# Patient Record
Sex: FEMALE | Race: WHITE | Employment: OTHER | URBAN - METROPOLITAN AREA
[De-identification: names, ages, dates, MRNs, and addresses within clinical notes are randomized per-mention and may not be internally consistent; named-entity substitution may affect disease eponyms.]

---

## 2018-11-13 ENCOUNTER — OFFICE VISIT (OUTPATIENT)
Dept: OTHER | Facility: IMAGING CENTER | Age: 38
End: 2018-11-13
Payer: COMMERCIAL

## 2018-11-13 VITALS
SYSTOLIC BLOOD PRESSURE: 148 MMHG | HEIGHT: 70 IN | DIASTOLIC BLOOD PRESSURE: 92 MMHG | RESPIRATION RATE: 14 BRPM | HEART RATE: 69 BPM | OXYGEN SATURATION: 99 % | BODY MASS INDEX: 18.38 KG/M2 | TEMPERATURE: 99.1 F | WEIGHT: 128.4 LBS

## 2018-11-13 DIAGNOSIS — Z00.00 HEALTHCARE MAINTENANCE: ICD-10-CM

## 2018-11-13 DIAGNOSIS — I10 ESSENTIAL HYPERTENSION: ICD-10-CM

## 2018-11-13 DIAGNOSIS — R53.83 FATIGUE, UNSPECIFIED TYPE: ICD-10-CM

## 2018-11-13 DIAGNOSIS — J30.2 SEASONAL ALLERGIES: ICD-10-CM

## 2018-11-13 DIAGNOSIS — E55.9 VITAMIN D DEFICIENCY: ICD-10-CM

## 2018-11-13 PROCEDURE — 99205 OFFICE O/P NEW HI 60 MIN: CPT | Performed by: INTERNAL MEDICINE

## 2018-11-13 ASSESSMENT — PATIENT HEALTH QUESTIONNAIRE - PHQ9: CLINICAL INTERPRETATION OF PHQ2 SCORE: 0

## 2018-11-13 NOTE — LETTER
[facility]  Kanchan Benavidez D.O.  [address] [place] [address]  Fax: [phone]   Authorization for Release/Disclosure of   Protected Health Information   Name: MARISOL SHOOK : 1980 SSN: [id number]   Address: [address] [place] [address] [place] [address] Phone:    [phone] (home)    I authorize the entity listed below to release/disclose the PHI below to:   [facility]/Kanchan Benavidez D.O. and Kanchan Benavidez D.O.   Provider or Entity Name:     Address   City, State, Zip   Phone:      Fax:     Reason for request: continuity of care   Information to be released:    [  ] LAST COLONOSCOPY,  including any PATH REPORT and follow-up  [  ] LAST FIT/COLOGUARD RESULT [  ] LAST DEXA  [  ] LAST MAMMOGRAM  [  ] LAST PAP  [  ] LAST LABS [  ] RETINA EXAM REPORT  [  ] IMMUNIZATION RECORDS  [  ] Release all info      [  ] Check here and initial the line next to each item to release ALL health information INCLUDING  _____ Care and treatment for drug and / or alcohol abuse  _____ HIV testing, infection status, or AIDS  _____ Genetic Testing    DATES OF SERVICE OR TIME PERIOD TO BE DISCLOSED: _____________  I understand and acknowledge that:  * This Authorization may be revoked at any time by you in writing, except if your health information has already been used or disclosed.  * Your health information that will be used or disclosed as a result of you signing this authorization could be re-disclosed by the recipient. If this occurs, your re-disclosed health information may no longer be protected by State or Federal laws.  * You may refuse to sign this Authorization. Your refusal will not affect your ability to obtain treatment.  * This Authorization becomes effective upon signing and will  on (date) __________.      If no date is indicated, this Authorization will  one (1) year from the signature date.    Name: Marisol Shook    Signature:   Date:     2018       PLEASE FAX REQUESTED RECORDS BACK  TO: [phone]

## 2018-11-13 NOTE — ASSESSMENT & PLAN NOTE
PAP 2016 (?)    Lifestyle  Exercise: Daily walks with her dog for a few miles.  Long hikes.  Occupational work is physical (LMT). Gardening/yard work.   Diet: Feels diet is overall healthy. Breakfast - eggs, Lunch - usually homemade leftovers. Dinner - Fresh veggies, meat, rice. Snack - crackers, nuts, bone broth. Beverages - coffee am (for caffeine/energy), water.  Sleep: Usually has trouble falling asleep.  Initially wakes up early no later than 6:30 AM.  Relaxation: Prior, nature walks, meditation, reading.  Hobbies include gardening, cooking, chicken raising, great outdoors.  Relationships:  Wonderful supportive  of 10 years. Love the work she does. Very spiritual and has a daily meditation/gratitude practice. Close girlfriends that are very supportive as well.  Current Stressors: Taking care of-balancing work and home.  Always busy.  Feels she has always had an active mind and tends to worry about everything and anything.  Prior experience with complementary therapies: acupuncture, herbs, homeopathy

## 2018-11-13 NOTE — PROGRESS NOTES
Chief Complaint   Patient presents with   • Establish Care   • Hypertension     went to the dentist in April and was told she has high BP, sees a couple [facility] doctors who suggested our practice      Bharti Shook is a 38 y.o. female who presents today to establish care at [facility] and to discuss hypertension.     HPI:  Essential hypertension  Patient reports a history of elevated blood pressure discovered at her visit to the dentist for routine cleaning.  Does not recall the numbers.  No family history of elevated blood pressure.  On past visits to her dentist, blood pressures have not been elevated.  Patient denies headaches or dizziness no changes in vision.  She has recently purchased a home cuff but has not been using very regularly as she feels it makes her anxious.  The highest she has gotten was systolic 160.  Bottom number usually ranges from .  She feels that it is very random.  Most other times are 120/80's.  Does not feel like she has too much stress in her life.  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  She does consider herself generally a high strung person and worries a lot.  Patient denies high sodium intake, does use Celtic Sea salt and some of her meals.  Has 1 cup of coffee in the morning per day.  Patient does not do aerobic exercise but is not sedentary.  Patient has been following with Dr. Lizette Raza and Ursula Sepulveda who advised her to establish with PCP for further evaluation and treatment.  Was on an  snack root extract supplement which did help bring down her blood pressure but she had side effects.  Prefers not to start medications but will if needed.  In the last 6 months, she does guided meditation in the mornings.  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    Notes that she has had lab work done at the end of March at [facility]. Recalls her thyroid labs were somewhat sluggish but overall normal.     Healthcare maintenance  PAP 2016  (?)    Lifestyle  Exercise: Daily walks with her dog for a few miles.  Long hikes.  Occupational work is physical (LMT). Gardening/yard work.   Diet: Feels diet is overall healthy. Breakfast - eggs, Lunch - usually homemade leftovers. Dinner - Fresh veggies, meat, rice. Snack - crackers, nuts, bone broth. Beverages - coffee am (for caffeine/energy), water.  Sleep: Usually has trouble falling asleep.  Initially wakes up early no later than 6:30 AM.  Relaxation: Prior, nature walks, meditation, reading.  Hobbies include gardening, cooking, chicken raising, great outdoors.  Relationships:  Wonderful supportive  of 10 years. Love the work she does. Very spiritual and has a daily meditation/gratitude practice. Close girlfriends that are very supportive as well.  Current Stressors: Taking care of-balancing work and home.  Always busy.  Feels she has always had an active mind and tends to worry about everything and anything.  Prior experience with complementary therapies: acupuncture, herbs, homeopathy    Seasonal allergies  Chronic condition.  Worse in the spring and fall.  Taking homeopathic remedies with improvement.    3/30/2018 Lab Amy  CBC, CMP, fasting lipids, ferritin, FSH, A1c within normal limits.  Vitamin D 25 hydroxy 40.3, TSH 2.74, free T3 3.4, free T4 1.3, TPO antibodies 12, thyroglobulin antibody < 1.0, TSI <0.1.    Current medicines (including changes today)  Current Outpatient Prescriptions   Medication Sig Dispense Refill   • Cholecalciferol (VITAMIN D PO) Take  by mouth.       No current facility-administered medications for this visit.      She  has a past medical history of Hypertension.  She  has a past surgical history that includes tympanoplasty (Bilateral).  Social History   Substance Use Topics   • Smoking status: Former Smoker   • Smokeless tobacco: Never Used      Comment: in college   • Alcohol use Yes      Comment: socially weekly     Family History   Problem Relation Age of Onset   •  "Thyroid Mother         hypothyroid, Hashimoto's   • Thyroid Maternal Grandmother         hypothyroid, Hashimoto's   • No Known Problems Father    • No Known Problems Brother    • Cancer Maternal Grandfather         bone or blood cancer   • Lung Cancer Paternal Grandmother         smoking history   • No Known Problems Paternal Grandfather      No family status information on file.     Social History     Social History Narrative   • No narrative on file     ROS  Constitutional: Negative for fever, chills, weight loss and malaise/fatigue.   HENT: Negative for ear pain, nosebleeds, congestion, sore throat and neck pain.    Eyes: Negative for blurred vision.   Respiratory: Negative for cough, sputum production, shortness of breath and wheezing.    Cardiovascular: Negative for chest pain, orthopnea and leg swelling.  Infrequent palpitations since she was a kid-seems to be worsened with increased stress.  Palpitations last for a few seconds and are stopped with a cough.  Occurs every few months.  Gastrointestinal: Negative for heartburn, nausea, vomiting and abdominal pain.   Genitourinary: Negative for dysuria, urgency and frequency.   Musculoskeletal: Negative for myalgias, back pain and joint pain.   Skin: Negative for rash and itching.   Neurological: Negative for dizziness, tingling, tremors, sensory change, focal weakness and headaches.   Endo/Heme/Allergies: Does not bruise/bleed easily.   Psychiatric/Behavioral: Negative for depression, anxiety, or memory loss.  Considers herself \"high-strung\" \"on edge\"  All other systems reviewed and are negative except as in HPI.     Objective:   Blood pressure 148/92, pulse 69, temperature 37.3 °C (99.1 °F), temperature source Temporal, resp. rate 14, height 1.778 m (5' 10\"), weight 58.2 kg (128 lb 6.4 oz), last menstrual period 11/11/2018, SpO2 99 %. Body mass index is 18.42 kg/m².  Physical Exam:  Constitutional: Alert, no distress.  Skin: Warm, dry, good turgor, no rashes in " visible areas.  Eye: Equal, round and reactive, conjunctiva clear, lids normal.  Neck: Trachea midline, no masses, no thyromegaly.  Respiratory: Unlabored respiratory effort, lungs clear to auscultation, no wheezes, no ronchi.  Cardiovascular: Normal S1, S2, no murmur, no edema.  Abdomen: Soft, non-tender, no masses, no hepatosplenomegaly.  Psych: Alert and oriented x3, normal affect and mood.    Assessment and Plan:   The following treatment plan was discussed  1. Essential hypertension   One reading documented in clinic setting but has had elevation with other providers. Advised patient to keep a log of her blood pressure readings for the next 2-week 2-3 times a day randomly.  Patient advised on proper blood pressure measurement technique.  Advised to return to office for MA/nurse visit with her blood pressure cuff for another BP check. R/O secondary HTN including renovascular cause. No hypokalemia or symptoms of sympathetic overactivity. Normal thyroid tests earlier this year. Discussed treatment options including medications (would advise HCTZ to start), however, patient would like to try some other interventions prior to medicatations. Discussed lifestyle changes including importance of being physically active (suggested daily for 7 minutes workout to start with goal to work up to 20 min daily), maintaining healthy weight, limiting alcohol intake, managing and coping with stress. Discussed continuing with mind-body therapies including meditation practices -handout about breathing exercises to do twice a day, consider starting Qi gong weekly.  Discussed dietary interventions including low-sodium limiting to 1800 mg/day, anti-inflammatory diet. Advise adding 1000 mg of omega 3 fatty acids as fish intake is low. Consider addition of magnesium non-oxide supplement 200-400 mg day. Cooking with garlic regularly and drinking hibiscus tea may have some benefits as well.  US-RENAL ARTERY DUPLEX COMP   2. Vitamin D  deficiency   Advise maintaining serum vitamin D 25-hydroxy level of greater than 40 ng/mL.  Recheck levels and continue current supplementation for now.  Patient will return with dosage she is currently using. VITAMIN D,25 HYDROXY   3. Fatigue, unspecified type   Mostly morning fatigue. Check vitamin D and B12 levels. Quality sleep may be a factor which we can discuss in more detail at next visit. VITAMIN B12    US-RENAL ARTERY DUPLEX COMP   4. Seasonal allergies   Overall stable with current treatment.    5. Healthcare maintenance   Discussed CDC recommendations for immunizations and USPSTF guidelines for screening exams.      Records requested.    Followup: Return in about 1 month (around 12/13/2018), or if symptoms worsen or fail to improve, for Lab review.  Spent 60 minutes in face-to-tace patient contact in which greater than 50% of the visit was spent in counseling and coordination of care including patient education about hypertension, differential diagnoses and treatment options including HCTZ, risks/benefits and alternatives including other lifestyle changes and use of mind-body therapies.  Discussed natural sources of magnesium.  Labs reviewed with the patient in detail. We also reviewed PMH, family history, and each of her chronic diagnoses in regards to current management, specialty physicians, symptom control, and future planning.  Please refer to assessment and plan for additional details.          I have worked with consultants from the vendor as well as technical experts from [employer] to optimize the interface. I have made every reasonable attempt to correct obvious errors, but I expect that there are errors of grammar and possibly content that I did not discover before finalizing the note.

## 2018-11-13 NOTE — ASSESSMENT & PLAN NOTE
Patient reports a history of elevated blood pressure discovered at her visit to the dentist for routine cleaning.  Does not recall the numbers.  No family history of elevated blood pressure.  On past visits to her dentist, blood pressures have not been elevated.  Patient denies headaches or dizziness no changes in vision.  She has recently purchased a home cuff but has not been using very regularly as she feels it makes her anxious.  The highest she has gotten was systolic 160.  Bottom number usually ranges from .  She feels that it is very random.  Most other times are 120/80's.  Does not feel like she has too much stress in her life.  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  She does consider herself generally a high strung person and worries a lot.  Patient denies high sodium intake, does use Celtic Sea salt and some of her meals.  Has 1 cup of coffee in the morning per day.  Patient does not do aerobic exercise but is not sedentary.  Patient has been following with Dr. Lizette Raza and Ursula Sepulveda who advised her to establish with PCP for further evaluation and treatment.  Was on an  snack root extract supplement which did help bring down her blood pressure but she had side effects.  Prefers not to start medications but will if needed.  In the last 6 months, she does guided meditation in the mornings.  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    Notes that she has had lab work done at the end of March at [facility]. Recalls her thyroid labs were somewhat sluggish but overall normal.

## 2018-12-13 ENCOUNTER — TELEPHONE (OUTPATIENT)
Dept: OTHER | Facility: IMAGING CENTER | Age: 38
End: 2018-12-13

## 2018-12-14 NOTE — TELEPHONE ENCOUNTER
Called and lm for patient regarding fax received from [employer]. They have tried to reach out to her and have not heard back. Gave patient their number to call and schedule.

## 2019-01-10 ENCOUNTER — OFFICE VISIT (OUTPATIENT)
Dept: URGENT CARE | Facility: MEDICAL CENTER | Age: 39
End: 2019-01-10
Payer: COMMERCIAL

## 2019-01-10 ENCOUNTER — HOSPITAL ENCOUNTER (OUTPATIENT)
Facility: MEDICAL CENTER | Age: 39
End: 2019-01-10
Attending: NURSE PRACTITIONER
Payer: COMMERCIAL

## 2019-01-10 VITALS
TEMPERATURE: 100.2 F | DIASTOLIC BLOOD PRESSURE: 90 MMHG | HEIGHT: 70 IN | SYSTOLIC BLOOD PRESSURE: 132 MMHG | HEART RATE: 90 BPM | BODY MASS INDEX: 18.32 KG/M2 | WEIGHT: 128 LBS | OXYGEN SATURATION: 99 %

## 2019-01-10 DIAGNOSIS — R30.0 DYSURIA: ICD-10-CM

## 2019-01-10 DIAGNOSIS — R35.0 FREQUENCY OF URINATION: ICD-10-CM

## 2019-01-10 DIAGNOSIS — N30.01 ACUTE CYSTITIS WITH HEMATURIA: ICD-10-CM

## 2019-01-10 LAB
APPEARANCE UR: CLEAR
BILIRUB UR STRIP-MCNC: NEGATIVE MG/DL
COLOR UR AUTO: YELLOW
GLUCOSE UR STRIP.AUTO-MCNC: NEGATIVE MG/DL
KETONES UR STRIP.AUTO-MCNC: NORMAL MG/DL
LEUKOCYTE ESTERASE UR QL STRIP.AUTO: NORMAL
NITRITE UR QL STRIP.AUTO: NEGATIVE
PH UR STRIP.AUTO: 6 [PH] (ref 5–8)
PROT UR QL STRIP: 30 MG/DL
RBC UR QL AUTO: NORMAL
SP GR UR STRIP.AUTO: 1.01
UROBILINOGEN UR STRIP-MCNC: 0.2 MG/DL

## 2019-01-10 PROCEDURE — 87077 CULTURE AEROBIC IDENTIFY: CPT

## 2019-01-10 PROCEDURE — 87086 URINE CULTURE/COLONY COUNT: CPT

## 2019-01-10 PROCEDURE — 81002 URINALYSIS NONAUTO W/O SCOPE: CPT | Performed by: NURSE PRACTITIONER

## 2019-01-10 PROCEDURE — 99214 OFFICE O/P EST MOD 30 MIN: CPT | Performed by: NURSE PRACTITIONER

## 2019-01-10 PROCEDURE — 87186 SC STD MICRODIL/AGAR DIL: CPT

## 2019-01-10 RX ORDER — NITROFURANTOIN 25; 75 MG/1; MG/1
100 CAPSULE ORAL EVERY 12 HOURS
Qty: 10 CAP | Refills: 0 | Status: SHIPPED | OUTPATIENT
Start: 2019-01-10 | End: 2019-01-15

## 2019-01-10 RX ORDER — PHENAZOPYRIDINE HYDROCHLORIDE 200 MG/1
200 TABLET, FILM COATED ORAL 3 TIMES DAILY
Qty: 6 TAB | Refills: 0 | Status: SHIPPED | OUTPATIENT
Start: 2019-01-10 | End: 2019-01-12

## 2019-01-10 ASSESSMENT — LIFESTYLE VARIABLES: SUBSTANCE_ABUSE: 0

## 2019-01-10 ASSESSMENT — ENCOUNTER SYMPTOMS
FEVER: 0
FLANK PAIN: 0
VOMITING: 0
BACK PAIN: 0
CHILLS: 1
SWEATS: 0
BRUISES/BLEEDS EASILY: 0
DIARRHEA: 0
DIZZINESS: 0
ABDOMINAL PAIN: 0
NAUSEA: 0
CONSTIPATION: 0

## 2019-01-11 DIAGNOSIS — R35.0 FREQUENCY OF URINATION: ICD-10-CM

## 2019-01-11 DIAGNOSIS — N30.01 ACUTE CYSTITIS WITH HEMATURIA: ICD-10-CM

## 2019-01-11 NOTE — PROGRESS NOTES
"Subjective:      Bharti Shook is a 38 y.o. female who presents with UTI (frequency/ urgency/ lower back pain/ abdominal cramp/ blood X1 1/2 week)         Denies past medical, surgical or family history that is significant to today's problem.   RX or OTC medications-- reviewed with patient today.   Allergies   Allergen Reactions   • Seasonal          Dysuria    This is a new problem. The current episode started 1 to 4 weeks ago. The problem occurs every urination. The problem has been rapidly worsening. The quality of the pain is described as aching and burning. The pain is at a severity of 5/10. The pain is moderate. There has been no fever. She is sexually active. There is no history of pyelonephritis. Associated symptoms include chills, frequency, hesitancy and urgency. Pertinent negatives include no discharge, flank pain, hematuria, nausea, sweats or vomiting. She has tried acetaminophen and increased fluids for the symptoms. The treatment provided mild relief. There is no history of catheterization, kidney stones, recurrent UTIs or urinary stasis.       Review of Systems   Constitutional: Positive for chills. Negative for fever and malaise/fatigue.   Gastrointestinal: Negative for abdominal pain, constipation, diarrhea, nausea and vomiting.   Genitourinary: Positive for dysuria, frequency, hesitancy and urgency. Negative for flank pain and hematuria.   Musculoskeletal: Negative for back pain.   Neurological: Negative for dizziness.   Endo/Heme/Allergies: Does not bruise/bleed easily.   Psychiatric/Behavioral: Negative for substance abuse.          Objective:     /90   Pulse 90   Temp 37.9 °C (100.2 °F) (Temporal)   Ht 1.778 m (5' 10\")   Wt 58.1 kg (128 lb)   SpO2 99%   BMI 18.37 kg/m²      Physical Exam   Constitutional: Vital signs are normal. She appears well-developed and well-nourished. She is cooperative.  Non-toxic appearance. She does not appear ill. No distress.   HENT:   Head: Normocephalic. "   Cardiovascular: Normal rate and regular rhythm.    Pulmonary/Chest: Effort normal and breath sounds normal.   Abdominal: Soft. Normal appearance. She exhibits no distension. There is no tenderness. There is no rigidity, no rebound, no guarding and no CVA tenderness.   Musculoskeletal:        Lumbar back: Normal.   Neurological: She is alert.   Skin: Skin is warm and dry. She is not diaphoretic.   Nursing note and vitals reviewed.              Assessment/Plan:     1. Frequency of urination    - POCT Urinalysis  - POCT Urinalysis  - Urine Culture; Future    2. Dysuria    - POCT Urinalysis  - phenazopyridine (PYRIDIUM) 200 MG Tab; Take 1 Tab by mouth 3 times a day for 2 days.  Dispense: 6 Tab; Refill: 0    3. Acute cystitis with hematuria    - Urine Culture; Future  - nitrofurantoin monohydr macro (MACROBID) 100 MG Cap; Take 1 Cap by mouth every 12 hours for 5 days.  Dispense: 10 Cap; Refill: 0    Keep well hydrated  Return to clinic or PCP  Prn  if current symptoms are not resolving in a satisfactory manner or sooner if new or worsening symptoms occur.   Patient was advised of signs and symptoms which would warrant further evaluation and /or emergent evaluation in ER.  Verbalized agreement with this treatment plan and seemed to understand without barriers. Questions were encouraged and answered to patients satisfaction.   Aftercare instructions were given to pt/ caregiver.

## 2019-01-13 LAB
BACTERIA UR CULT: ABNORMAL
BACTERIA UR CULT: ABNORMAL
SIGNIFICANT IND 70042: ABNORMAL
SITE SITE: ABNORMAL
SOURCE SOURCE: ABNORMAL

## 2021-02-27 ENCOUNTER — OFFICE VISIT (OUTPATIENT)
Dept: URGENT CARE | Facility: CLINIC | Age: 41
End: 2021-02-27
Payer: COMMERCIAL

## 2021-02-27 VITALS
OXYGEN SATURATION: 97 % | WEIGHT: 130 LBS | RESPIRATION RATE: 16 BRPM | SYSTOLIC BLOOD PRESSURE: 190 MMHG | DIASTOLIC BLOOD PRESSURE: 104 MMHG | HEIGHT: 70 IN | TEMPERATURE: 97.3 F | HEART RATE: 81 BPM | BODY MASS INDEX: 18.61 KG/M2

## 2021-02-27 DIAGNOSIS — R09.82 POST-NASAL DRIP: ICD-10-CM

## 2021-02-27 DIAGNOSIS — I10 ESSENTIAL HYPERTENSION: ICD-10-CM

## 2021-02-27 DIAGNOSIS — R21 RASH AND NONSPECIFIC SKIN ERUPTION: ICD-10-CM

## 2021-02-27 PROCEDURE — 99214 OFFICE O/P EST MOD 30 MIN: CPT | Performed by: NURSE PRACTITIONER

## 2021-02-27 RX ORDER — LORATADINE 10 MG/1
1 CAPSULE, LIQUID FILLED ORAL DAILY
Qty: 30 CAPSULE | Refills: 0 | Status: SHIPPED | OUTPATIENT
Start: 2021-02-27 | End: 2021-02-27

## 2021-02-27 RX ORDER — UBIDECARENONE 75 MG
100 CAPSULE ORAL DAILY
COMMUNITY

## 2021-02-27 RX ORDER — LISINOPRIL 10 MG/1
10 TABLET ORAL DAILY
Qty: 30 TABLET | Refills: 1 | Status: SHIPPED | OUTPATIENT
Start: 2021-02-27 | End: 2021-03-29

## 2021-02-27 RX ORDER — LORATADINE 10 MG/1
1 CAPSULE, LIQUID FILLED ORAL DAILY
Qty: 30 CAPSULE | Refills: 1 | Status: SHIPPED | OUTPATIENT
Start: 2021-02-27 | End: 2021-03-29

## 2021-02-27 ASSESSMENT — ENCOUNTER SYMPTOMS
WHEEZING: 0
SHORTNESS OF BREATH: 0
HEADACHES: 0
CLAUDICATION: 0
COUGH: 0
DIZZINESS: 0
PND: 0
PALPITATIONS: 0
ORTHOPNEA: 0

## 2021-02-27 NOTE — PROGRESS NOTES
Subjective:   Bharti Shook is a 40 y.o. female who presents for Rash (face, chest and back x1 wk ) and Sore Throat      HPI  40 year old female patient in urgent care for new onset rash to face, chest and back as well as a sore throat.  Patient denies any changes to her diet, personal hygiene products.  Patient has been using over-the-counter Benadryl and Benadryl cream with moderate relief of symptoms.    When asked about elevated blood pressure patient states that she has been told she has it in the past but has never been treated for it.  Denies changes in vision, dizziness, headaches, chest pain, nausea or vomiting.    Review of Systems   Respiratory: Negative for cough, shortness of breath and wheezing.    Cardiovascular: Negative for chest pain, palpitations, orthopnea, claudication, leg swelling and PND.   Genitourinary: Negative for frequency, hematuria and urgency.   Skin: Positive for itching and rash.   Neurological: Negative for dizziness and headaches.       Patient Active Problem List   Diagnosis   • Essential hypertension   • Healthcare maintenance   • Seasonal allergies     Past Surgical History:   Procedure Laterality Date   • TYMPANOPLASTY Bilateral     child to age 19     Social History     Tobacco Use   • Smoking status: Former Smoker   • Smokeless tobacco: Never Used   • Tobacco comment: in college   Substance Use Topics   • Alcohol use: Yes     Comment: socially weekly   • Drug use: No      Family History   Problem Relation Age of Onset   • Thyroid Mother         hypothyroid, Hashimoto's   • Thyroid Maternal Grandmother         hypothyroid, Hashimoto's   • No Known Problems Father    • No Known Problems Brother    • Cancer Maternal Grandfather         bone or blood cancer   • Lung Cancer Paternal Grandmother         smoking history   • No Known Problems Paternal Grandfather       (Allergies, Medications, & Tobacco/Substance Use were reconciled by the Medical Assistant and reviewed by myself.  "The family history is prepopulated)     Objective:     BP (!) 190/104 (BP Location: Right arm, Patient Position: Sitting, BP Cuff Size: Adult)   Pulse 81   Temp 36.3 °C (97.3 °F) (Temporal)   Resp 16   Ht 1.778 m (5' 10\")   Wt 59 kg (130 lb)   SpO2 97%   Breastfeeding No   BMI 18.65 kg/m²     Physical Exam  Vitals reviewed.   Constitutional:       Appearance: Normal appearance.   HENT:      Mouth/Throat:      Mouth: Mucous membranes are moist.      Comments: Postnasal drip noted  Cardiovascular:      Rate and Rhythm: Normal rate and regular rhythm.      Heart sounds: Normal heart sounds.   Pulmonary:      Effort: Pulmonary effort is normal.      Breath sounds: Normal breath sounds.   Skin:     General: Skin is warm.      Findings: Rash present. Rash is macular.      Comments: Diffuse macular rash on back, chest and improving symptoms of the face.   Neurological:      Mental Status: She is alert and oriented to person, place, and time.   Psychiatric:         Mood and Affect: Mood normal.         Behavior: Behavior normal.         Thought Content: Thought content normal.         Judgment: Judgment normal.         Assessment/Plan:     1. Post-nasal drip  Loratadine (CLARITIN) 10 MG Cap    DISCONTINUED: Loratadine (CLARITIN) 10 MG Cap   2. Essential hypertension  lisinopril (PRINIVIL) 10 MG Tab    REFERRAL TO FOLLOW-UP WITH PRIMARY CARE   3. Rash and nonspecific skin eruption  REFERRAL TO FOLLOW-UP WITH PRIMARY CARE    REFERRAL TO DERMATOLOGY     Discussed physical examination findings as well as patient presentation, length of patient symptoms is consistent with unknown rash of unknown etiology.  Advised to continue to take Benadryl, use Benadryl cream and will prescribe Claritin.  Will send patient to primary care for further work-up and evaluation.  Will send another referral to dermatology if symptoms are persisting patient needs further work-up    In regards to patient's elevated blood pressure, due to " known history will start 10 mg of lisinopril to take daily.  Advised patient to take blood pressure daily to watch for hypertensive trends and will send referral to primary care for further work-up and evaluation.  Discussed DASH diet, exercise.    Differential diagnosis, natural history, supportive care, and indications for immediate follow-up discussed.    Advised the patient to follow-up with the primary care physician for recheck, reevaluation, and consideration of further management.    Please note that this dictation was created using voice recognition software. I have made a reasonable attempt to correct obvious errors, but I expect that there are errors of grammar and possibly content that I did not discover before finalizing the note.    This note was electronically signed JEFF Cullen

## 2021-03-03 ENCOUNTER — TELEPHONE (OUTPATIENT)
Dept: SCHEDULING | Facility: IMAGING CENTER | Age: 41
End: 2021-03-03

## 2021-03-12 ENCOUNTER — OFFICE VISIT (OUTPATIENT)
Dept: MEDICAL GROUP | Facility: IMAGING CENTER | Age: 41
End: 2021-03-12
Payer: COMMERCIAL

## 2021-03-12 VITALS
TEMPERATURE: 98.3 F | WEIGHT: 121.6 LBS | OXYGEN SATURATION: 98 % | BODY MASS INDEX: 17.41 KG/M2 | HEIGHT: 70 IN | DIASTOLIC BLOOD PRESSURE: 98 MMHG | RESPIRATION RATE: 12 BRPM | SYSTOLIC BLOOD PRESSURE: 152 MMHG | HEART RATE: 86 BPM

## 2021-03-12 DIAGNOSIS — I10 ESSENTIAL HYPERTENSION: ICD-10-CM

## 2021-03-12 DIAGNOSIS — F41.9 ANXIETY: ICD-10-CM

## 2021-03-12 DIAGNOSIS — R21 MALAR RASH: ICD-10-CM

## 2021-03-12 DIAGNOSIS — Z76.89 ENCOUNTER TO ESTABLISH CARE: ICD-10-CM

## 2021-03-12 DIAGNOSIS — R53.83 OTHER FATIGUE: ICD-10-CM

## 2021-03-12 DIAGNOSIS — Z12.31 ENCOUNTER FOR SCREENING MAMMOGRAM FOR BREAST CANCER: ICD-10-CM

## 2021-03-12 DIAGNOSIS — J30.2 SEASONAL ALLERGIES: ICD-10-CM

## 2021-03-12 DIAGNOSIS — R61 DIAPHORESIS: ICD-10-CM

## 2021-03-12 PROCEDURE — 99214 OFFICE O/P EST MOD 30 MIN: CPT | Performed by: PHYSICIAN ASSISTANT

## 2021-03-12 RX ORDER — PROPRANOLOL HYDROCHLORIDE 80 MG/1
80 CAPSULE, EXTENDED RELEASE ORAL DAILY
Qty: 30 CAPSULE | Refills: 1 | Status: SHIPPED | OUTPATIENT
Start: 2021-03-12 | End: 2021-05-10

## 2021-03-12 ASSESSMENT — PAIN SCALES - GENERAL: PAINLEVEL: NO PAIN

## 2021-03-12 ASSESSMENT — PATIENT HEALTH QUESTIONNAIRE - PHQ9: CLINICAL INTERPRETATION OF PHQ2 SCORE: 0

## 2021-03-12 NOTE — ASSESSMENT & PLAN NOTE
Patient complains of a rash across the bridge of her nose and her face.  She went to urgent care. They told her to take Benadryl.  No joint pain.  Her mother has a history of lupus.

## 2021-03-12 NOTE — PROGRESS NOTES
Subjective:     CC:    Chief Complaint   Patient presents with   • Establish Care   • Blood Pressure Problem     high reading in UC 2 weeks ago          HISTORY OF THE PRESENT ILLNESS: Patient is a 40 y.o. female.   Essential hypertension  On Lisinopril for the past two weeks.  Blood pressure slightly improved but still elevated.  Patient follows a balanced diet.    Anxiety  Patient with occasional anxiety with associated palpitations and sweating.  She also has elevated blood pressure.  She has never been diagnosed with a thyroid disorder.  Her mom does have Hashimoto's.  Patient states she has a hard time gaining weight.    Malar rash  Patient complains of a rash across the bridge of her nose and her face.  She went to urgent care. They told her to take Benadryl.  No joint pain.  Her mother has a history of lupus.      Depression Screening    Little interest or pleasure in doing things?  0 - not at all   Feeling down, depressed , or hopeless? 0 - not at all       Allergies:   Seasonal  Current Outpatient Medications Ordered in Epic   Medication Sig Dispense Refill   • diphenhydrAMINE HCl (BENADRYL ALLERGY PO) Take  by mouth.     • propranolol CR (INDERAL LA) 80 MG CAPSULE SR 24 HR Take 1 capsule by mouth every day. 30 capsule 1   • cyanocobalamin (VITAMIN B-12) 100 MCG Tab Take 100 mcg by mouth every day.     • lisinopril (PRINIVIL) 10 MG Tab Take 1 tablet by mouth every day for 30 days. 30 tablet 1   • Loratadine (CLARITIN) 10 MG Cap Take 1 capsule by mouth every day for 30 days. 30 capsule 1   • Cholecalciferol (VITAMIN D PO) Take  by mouth.       No current Epic-ordered facility-administered medications on file.     Past Medical History:   Diagnosis Date   • Allergy    • Hypertension      Past Surgical History:   Procedure Laterality Date   • TYMPANOPLASTY Bilateral     child to age 19     Social History     Tobacco Use   • Smoking status: Former Smoker   • Smokeless tobacco: Never Used   • Tobacco comment: in  "college   Substance Use Topics   • Alcohol use: Not Currently     Comment: socially weekly   • Drug use: No     Social History     Social History Narrative   • Not on file     Family History   Problem Relation Age of Onset   • Thyroid Mother         hypothyroid, Hashimoto's   • Lupus Mother    • Thyroid Maternal Grandmother         hypothyroid, Hashimoto's   • No Known Problems Father    • No Known Problems Brother    • Cancer Maternal Grandfather         bone or blood cancer   • Lung Cancer Paternal Grandmother         smoking history   • No Known Problems Paternal Grandfather      Health Maintenance:  Diet: balanced, dairy free    Ob-Gyn/ History:    Patient has GYN provider: no  /Para:  0/0  Patient's last menstrual period was 2021.  Last pap smear: 5-6 years ago  History of abnormal pap smears: no  Current Contraceptive Method: vasectomy  Currently sexually active: yes  H/O STD: no  Periods regular  Cramping is mild    Breast Cancer Screening: ordering today       ROS:   Gen: no fevers/chills, no changes in weight  Eyes: no changes in vision  ENT: no sore throat, no hearing loss, no bloody nose  Pulm: no sob, no cough  CV: no chest pain, no palpitations  GI: no nausea/vomiting, no diarrhea  : no dysuria or hematuria  MSk: no myalgias  Skin: no rash  Neuro: no headaches, no numbness/tingling  Heme/Lymph: no easy bruising      Objective:     Exam: /98 (BP Location: Left arm, Patient Position: Sitting, BP Cuff Size: Small adult)   Pulse 86   Temp 36.8 °C (98.3 °F) (Temporal)   Resp 12   Ht 1.778 m (5' 10\")   Wt 55.2 kg (121 lb 9.6 oz)   SpO2 98%  Body mass index is 17.45 kg/m².  General: Normal appearing. No distress.  HEENT: Normocephalic. Eyes conjunctiva clear lids without ptosis, pupils equal and reactive to light accommodation, ears normal shape and contour, canals are clear bilaterally, tympanic membranes are benign, nasal mucosa benign, oropharynx is without erythema, edema or " exudates.   Neck: Supple without JVD or bruit. Thyroid is not enlarged.  Pulmonary: Clear to ausculation.  Normal effort. No rales, ronchi, or wheezing.  Cardiovascular: Regular rate and rhythm without murmur. Carotid and radial pulses are intact and equal bilaterally.  Abdomen: Soft, nontender, nondistended. Normal bowel sounds. Liver and spleen are not palpable  Neurologic: Grossly nonfocal  Lymph: No cervical or supraclavicular lymph nodes are palpable  Skin: Warm and dry.  No obvious lesions.  Musculoskeletal: Normal gait. No extremity cyanosis, clubbing, or edema.  Psych: Normal mood and affect. Alert and oriented x3. Judgment and insight is normal.    Assessment & Plan:   40 y.o. female with the following -  1. Encounter to establish care  Pleasant 40-year-old female here to establish care.  Diet discussed.  Lab orders reviewed with patient.  Future Pap smear this year with HPV testing.  Tdap next visit.    2. Essential hypertension  Newly diagnosed, uncontrolled.  Suspect underlying hypothyroidism see lab orders below.  Advised patient to start propranolol, will likely discontinue lisinopril in the future and just focus on a beta-blocker, but for now I advised patient to take half tab every morning of the lisinopril.  She will check her blood pressure once a day in the afternoon.  - CBC WITH DIFFERENTIAL; Future  - Comp Metabolic Panel; Future  - HEMOGLOBIN A1C; Future  - Lipid Profile; Future  - CORTISOL; Future  - propranolol CR (INDERAL LA) 80 MG CAPSULE SR 24 HR; Take 1 capsule by mouth every day.  Dispense: 30 capsule; Refill: 1    3. Seasonal allergies  On Claritin    4. Encounter for screening mammogram for breast cancer  - MA-SCREENING MAMMO BILAT W/TOMOSYNTHESIS W/CAD; Future    5. Diaphoresis  Likely related to underlying hyperthyroidism  - CORTISOL; Future    6. Other fatigue  - CBC WITH DIFFERENTIAL; Future  - Comp Metabolic Panel; Future  - ANTI-THYROID ANTIBODIES; Future  - THYROID PEROX AB TPO  (REFLEX); Future  - THYROTROPIN RECEP AB; Future  - VITAMIN B12; Future  - IRON/TOTAL IRON BIND; Future  - FERRITIN; Future  - RETICULOCYTES COUNT; Future  - VITAMIN D,25 HYDROXY; Future  - CORTISOL; Future    7. Anxiety  Likely related to underlying hyperthyroidism.  Await lab results.  Start propranolol.  - TSH WITH REFLEX TO FT4; Future  - FREE THYROXINE; Future  - T3 FREE; Future  - ANTI-THYROID ANTIBODIES; Future  - THYROID PEROX AB TPO (REFLEX); Future  - THYROTROPIN RECEP AB; Future  - CORTISOL; Future  - propranolol CR (INDERAL LA) 80 MG CAPSULE SR 24 HR; Take 1 capsule by mouth every day.  Dispense: 30 capsule; Refill: 1    8. Malar rash  Rule out secondary to lupus  - MIRTHA REFLEXIVE PROFILE; Future      We will contact patient once labs resulted.  Future Pap smear this year.  Return in about 3 weeks (around 4/2/2021) for Follow-up.    Karine Valencia PA-C    Please note that this dictation was created using voice recognition software. I have made every reasonable attempt to correct obvious errors, but I expect that there are errors of grammar and possibly content that I did not discover before finalizing the note.

## 2021-03-12 NOTE — PATIENT INSTRUCTIONS
Recommend DASH diet, exercise as tolerated. Monitor Blood Pressure at home and report any consistent readings above >150/90 to me on MyChart. Seek medical help/ER if chest pain, palpitations, shortness of breath, headache, dizziness.

## 2021-03-12 NOTE — ASSESSMENT & PLAN NOTE
Patient with occasional anxiety with associated palpitations and sweating.  She also has elevated blood pressure.  She has never been diagnosed with a thyroid disorder.  Her mom does have Hashimoto's.  Patient states she has a hard time gaining weight.

## 2021-03-12 NOTE — ASSESSMENT & PLAN NOTE
On Lisinopril for the past two weeks.  Blood pressure slightly improved but still elevated.  Patient follows a balanced diet.

## 2021-03-16 ENCOUNTER — HOSPITAL ENCOUNTER (OUTPATIENT)
Dept: LAB | Facility: MEDICAL CENTER | Age: 41
End: 2021-03-16
Attending: PHYSICIAN ASSISTANT
Payer: COMMERCIAL

## 2021-03-16 DIAGNOSIS — F41.9 ANXIETY: ICD-10-CM

## 2021-03-16 DIAGNOSIS — R53.83 OTHER FATIGUE: ICD-10-CM

## 2021-03-16 DIAGNOSIS — I10 ESSENTIAL HYPERTENSION: ICD-10-CM

## 2021-03-16 DIAGNOSIS — R21 MALAR RASH: ICD-10-CM

## 2021-03-16 DIAGNOSIS — R61 DIAPHORESIS: ICD-10-CM

## 2021-03-16 PROCEDURE — 84439 ASSAY OF FREE THYROXINE: CPT

## 2021-03-16 PROCEDURE — 83550 IRON BINDING TEST: CPT

## 2021-03-16 PROCEDURE — 82306 VITAMIN D 25 HYDROXY: CPT

## 2021-03-16 PROCEDURE — 85025 COMPLETE CBC W/AUTO DIFF WBC: CPT

## 2021-03-16 PROCEDURE — 86038 ANTINUCLEAR ANTIBODIES: CPT

## 2021-03-16 PROCEDURE — 82728 ASSAY OF FERRITIN: CPT

## 2021-03-16 PROCEDURE — 36415 COLL VENOUS BLD VENIPUNCTURE: CPT

## 2021-03-16 PROCEDURE — 83036 HEMOGLOBIN GLYCOSYLATED A1C: CPT

## 2021-03-16 PROCEDURE — 80053 COMPREHEN METABOLIC PANEL: CPT

## 2021-03-16 PROCEDURE — 83520 IMMUNOASSAY QUANT NOS NONAB: CPT

## 2021-03-16 PROCEDURE — 82533 TOTAL CORTISOL: CPT

## 2021-03-16 PROCEDURE — 86376 MICROSOMAL ANTIBODY EACH: CPT

## 2021-03-16 PROCEDURE — 84443 ASSAY THYROID STIM HORMONE: CPT

## 2021-03-16 PROCEDURE — 86800 THYROGLOBULIN ANTIBODY: CPT

## 2021-03-16 PROCEDURE — 80061 LIPID PANEL: CPT

## 2021-03-16 PROCEDURE — 85046 RETICYTE/HGB CONCENTRATE: CPT

## 2021-03-16 PROCEDURE — 82607 VITAMIN B-12: CPT

## 2021-03-16 PROCEDURE — 84481 FREE ASSAY (FT-3): CPT

## 2021-03-16 PROCEDURE — 83540 ASSAY OF IRON: CPT

## 2021-03-17 LAB
25(OH)D3 SERPL-MCNC: 26 NG/ML (ref 30–100)
ALBUMIN SERPL BCP-MCNC: 4.2 G/DL (ref 3.2–4.9)
ALBUMIN/GLOB SERPL: 1.8 G/DL
ALP SERPL-CCNC: 60 U/L (ref 30–99)
ALT SERPL-CCNC: 14 U/L (ref 2–50)
ANION GAP SERPL CALC-SCNC: 10 MMOL/L (ref 7–16)
AST SERPL-CCNC: 16 U/L (ref 12–45)
BASOPHILS # BLD AUTO: 1 % (ref 0–1.8)
BASOPHILS # BLD: 0.07 K/UL (ref 0–0.12)
BILIRUB SERPL-MCNC: 0.3 MG/DL (ref 0.1–1.5)
BUN SERPL-MCNC: 19 MG/DL (ref 8–22)
CALCIUM SERPL-MCNC: 9.5 MG/DL (ref 8.5–10.5)
CHLORIDE SERPL-SCNC: 102 MMOL/L (ref 96–112)
CHOLEST SERPL-MCNC: 174 MG/DL (ref 100–199)
CO2 SERPL-SCNC: 25 MMOL/L (ref 20–33)
CORTIS SERPL-MCNC: 15.6 UG/DL (ref 0–23)
CREAT SERPL-MCNC: 0.74 MG/DL (ref 0.5–1.4)
EOSINOPHIL # BLD AUTO: 0.31 K/UL (ref 0–0.51)
EOSINOPHIL NFR BLD: 4.2 % (ref 0–6.9)
ERYTHROCYTE [DISTWIDTH] IN BLOOD BY AUTOMATED COUNT: 44 FL (ref 35.9–50)
EST. AVERAGE GLUCOSE BLD GHB EST-MCNC: 117 MG/DL
FASTING STATUS PATIENT QL REPORTED: NORMAL
FERRITIN SERPL-MCNC: 42.9 NG/ML (ref 10–291)
GLOBULIN SER CALC-MCNC: 2.3 G/DL (ref 1.9–3.5)
GLUCOSE SERPL-MCNC: 92 MG/DL (ref 65–99)
HBA1C MFR BLD: 5.7 % (ref 4–5.6)
HCT VFR BLD AUTO: 38.6 % (ref 37–47)
HDLC SERPL-MCNC: 74 MG/DL
HGB BLD-MCNC: 12.6 G/DL (ref 12–16)
HGB RETIC QN AUTO: 35.7 PG/CELL (ref 29–35)
IMM GRANULOCYTES # BLD AUTO: 0.02 K/UL (ref 0–0.11)
IMM GRANULOCYTES NFR BLD AUTO: 0.3 % (ref 0–0.9)
IMM RETICS NFR: 6.6 % (ref 9.3–17.4)
IRON SATN MFR SERPL: 28 % (ref 15–55)
IRON SERPL-MCNC: 80 UG/DL (ref 40–170)
LDLC SERPL CALC-MCNC: 87 MG/DL
LYMPHOCYTES # BLD AUTO: 2.05 K/UL (ref 1–4.8)
LYMPHOCYTES NFR BLD: 28 % (ref 22–41)
MCH RBC QN AUTO: 30.7 PG (ref 27–33)
MCHC RBC AUTO-ENTMCNC: 32.6 G/DL (ref 33.6–35)
MCV RBC AUTO: 94.1 FL (ref 81.4–97.8)
MONOCYTES # BLD AUTO: 0.5 K/UL (ref 0–0.85)
MONOCYTES NFR BLD AUTO: 6.8 % (ref 0–13.4)
NEUTROPHILS # BLD AUTO: 4.38 K/UL (ref 2–7.15)
NEUTROPHILS NFR BLD: 59.7 % (ref 44–72)
NRBC # BLD AUTO: 0 K/UL
NRBC BLD-RTO: 0 /100 WBC
PLATELET # BLD AUTO: 185 K/UL (ref 164–446)
PMV BLD AUTO: 13.5 FL (ref 9–12.9)
POTASSIUM SERPL-SCNC: 3.8 MMOL/L (ref 3.6–5.5)
PROT SERPL-MCNC: 6.5 G/DL (ref 6–8.2)
RBC # BLD AUTO: 4.1 M/UL (ref 4.2–5.4)
RETICS # AUTO: 0.03 M/UL (ref 0.04–0.06)
RETICS/RBC NFR: 0.8 % (ref 0.8–2.1)
SODIUM SERPL-SCNC: 137 MMOL/L (ref 135–145)
T3FREE SERPL-MCNC: 3.22 PG/ML (ref 2–4.4)
T4 FREE SERPL-MCNC: 1.21 NG/DL (ref 0.93–1.7)
THYROPEROXIDASE AB SERPL-ACNC: <9 IU/ML (ref 0–9)
TIBC SERPL-MCNC: 282 UG/DL (ref 250–450)
TRIGL SERPL-MCNC: 63 MG/DL (ref 0–149)
TSH SERPL DL<=0.005 MIU/L-ACNC: 3.3 UIU/ML (ref 0.38–5.33)
UIBC SERPL-MCNC: 202 UG/DL (ref 110–370)
VIT B12 SERPL-MCNC: 496 PG/ML (ref 211–911)
WBC # BLD AUTO: 7.3 K/UL (ref 4.8–10.8)

## 2021-03-18 LAB
NUCLEAR IGG SER QL IA: NORMAL
THYROGLOB AB SERPL-ACNC: <0.9 IU/ML (ref 0–4)
THYROPEROXIDASE AB SERPL-ACNC: <0.3 IU/ML (ref 0–9)

## 2021-03-19 LAB — TSH RECEP AB SER-ACNC: <0.9 IU/L

## 2021-04-02 ENCOUNTER — OFFICE VISIT (OUTPATIENT)
Dept: MEDICAL GROUP | Facility: IMAGING CENTER | Age: 41
End: 2021-04-02
Payer: COMMERCIAL

## 2021-04-02 VITALS
DIASTOLIC BLOOD PRESSURE: 80 MMHG | WEIGHT: 120 LBS | RESPIRATION RATE: 12 BRPM | HEART RATE: 60 BPM | TEMPERATURE: 98.6 F | SYSTOLIC BLOOD PRESSURE: 128 MMHG | HEIGHT: 70 IN | OXYGEN SATURATION: 97 % | BODY MASS INDEX: 17.18 KG/M2

## 2021-04-02 DIAGNOSIS — I10 ESSENTIAL HYPERTENSION: ICD-10-CM

## 2021-04-02 DIAGNOSIS — F41.9 ANXIETY: ICD-10-CM

## 2021-04-02 DIAGNOSIS — R73.02 IGT (IMPAIRED GLUCOSE TOLERANCE): ICD-10-CM

## 2021-04-02 DIAGNOSIS — E55.9 VITAMIN D DEFICIENCY: ICD-10-CM

## 2021-04-02 DIAGNOSIS — R21 MALAR RASH: ICD-10-CM

## 2021-04-02 PROCEDURE — 99214 OFFICE O/P EST MOD 30 MIN: CPT | Performed by: PHYSICIAN ASSISTANT

## 2021-04-02 ASSESSMENT — PAIN SCALES - GENERAL: PAINLEVEL: NO PAIN

## 2021-04-02 ASSESSMENT — FIBROSIS 4 INDEX: FIB4 SCORE: 0.95

## 2021-04-02 NOTE — ASSESSMENT & PLAN NOTE
Chronic, likely related to concomitant anxiety.  Propranolol improving symptoms.  Blood pressure improved.  Tolerating medication.  No side effects.  No dizziness or severe fatigue.  Patient states the propranolol is helping her anxiety overall.

## 2021-04-02 NOTE — PROGRESS NOTES
"Subjective:     CC:   Chief Complaint   Patient presents with   • Lab Results     HPI:   Bharti presents today with    Essential hypertension  Chronic, likely related to concomitant anxiety.  Propranolol improving symptoms.  Blood pressure improved.  Tolerating medication.  No side effects.  No dizziness or severe fatigue.  Patient states the propranolol is helping her anxiety overall.    Anxiety  Overall improved on propranolol.      Past Medical History:   Diagnosis Date   • Allergy    • Hypertension      Social History     Tobacco Use   • Smoking status: Former Smoker   • Smokeless tobacco: Never Used   • Tobacco comment: in college   Substance Use Topics   • Alcohol use: Not Currently     Comment: socially weekly   • Drug use: No     Current Outpatient Medications Ordered in Epic   Medication Sig Dispense Refill   • diphenhydrAMINE HCl (BENADRYL ALLERGY PO) Take  by mouth.     • propranolol CR (INDERAL LA) 80 MG CAPSULE SR 24 HR Take 1 capsule by mouth every day. 30 capsule 1   • cyanocobalamin (VITAMIN B-12) 100 MCG Tab Take 100 mcg by mouth every day.     • Cholecalciferol (VITAMIN D PO) Take  by mouth.       No current Epic-ordered facility-administered medications on file.     Seasonal    Health Maintenance: Completed    ROS:  Gen: no fevers/chills, no changes in weight  Eyes: no changes in vision  ENT: no sore throat, no hearing loss, no bloody nose  Pulm: no sob, no cough  CV: no chest pain, no palpitations, no edema  GI: no nausea/vomiting, no diarrhea  : no dysuria  Skin: no rash, no lesions  Neuro: no headaches, no numbness/tingling  Heme/Lymph: no easy bruising or bleeding    Objective:   Exam:  /80 (BP Location: Left arm, Patient Position: Sitting, BP Cuff Size: Adult)   Pulse 60   Temp 37 °C (98.6 °F) (Temporal)   Resp 12   Ht 1.778 m (5' 10\")   Wt 54.4 kg (120 lb)   LMP 03/07/2021 (Exact Date)   SpO2 97%   BMI 17.22 kg/m²    Body mass index is 17.22 kg/m².    Gen: Alert and oriented, " No apparent distress.  HEENT: Head atraumatic, normocephalic. PERRLA. No oral lesions.  Neck: Neck is supple without lymphadenopathy.   Lungs: Normal effort, CTA bilaterally, no wheezes, rhonchi, or rales  CV: Regular rate and rhythm. No murmurs, rubs, or gallops.  ABD: +BS. Non-tender, non-distended. No rebound, rigidity, or guarding.  Ext: No clubbing, cyanosis, edema.  Assessment & Plan:     40 y.o. female with the following -     1. Vitamin D deficiency  Continue vitamin D supplementation  - VITAMIN D,25 HYDROXY; Future    2. IGT (impaired glucose tolerance)  A1c 5.7.  Recheck in 3 months.  Refer to nutrition services.  Rule out pancreatic dysfunction see orders below.  Patient to get labs drawn 1 week prior to her appointment.  - REFERRAL TO NUTRITION SERVICES  - HEMOGLOBIN A1C; Future  - INSULIN FASTING; Future  - AMYLASE; Future  - LIPASE; Future    3. Essential hypertension  Chronic, controlled on propranolol.  Continue current dose.    4. Anxiety  Improved on propranolol    5. Malar rash  Derm eval for completeness  - REFERRAL TO DERMATOLOGY    Return in about 3 months (around 7/2/2021).    Karine Maynard PA-C ([place])  Physician Assistant Certified  [facility]    Please note that this dictation was created using voice recognition software. I have made every reasonable attempt to correct obvious errors, but I expect that there are errors of grammar and possibly content that I did not discover before finalizing the note.

## 2022-01-25 ENCOUNTER — TELEMEDICINE (OUTPATIENT)
Dept: MEDICAL GROUP | Facility: IMAGING CENTER | Age: 42
End: 2022-01-25
Payer: COMMERCIAL

## 2022-01-25 VITALS — HEIGHT: 70 IN | BODY MASS INDEX: 17.22 KG/M2

## 2022-01-25 DIAGNOSIS — F41.0 PANIC ATTACKS: ICD-10-CM

## 2022-01-25 DIAGNOSIS — F32.A ANXIETY AND DEPRESSION: ICD-10-CM

## 2022-01-25 DIAGNOSIS — F41.9 ANXIETY AND DEPRESSION: ICD-10-CM

## 2022-01-25 PROCEDURE — 99214 OFFICE O/P EST MOD 30 MIN: CPT | Mod: 95 | Performed by: PHYSICIAN ASSISTANT

## 2022-01-25 RX ORDER — ESCITALOPRAM OXALATE 5 MG/1
5 TABLET ORAL DAILY
Qty: 30 TABLET | Refills: 0 | Status: SHIPPED | OUTPATIENT
Start: 2022-01-25 | End: 2022-02-22

## 2022-01-25 RX ORDER — HYDROXYZINE HYDROCHLORIDE 25 MG/1
25 TABLET, FILM COATED ORAL 3 TIMES DAILY PRN
Qty: 30 TABLET | Refills: 0 | Status: SHIPPED | OUTPATIENT
Start: 2022-01-25 | End: 2022-03-11 | Stop reason: SDUPTHER

## 2022-01-25 ASSESSMENT — ANXIETY QUESTIONNAIRES
1. FEELING NERVOUS, ANXIOUS, OR ON EDGE: NEARLY EVERY DAY
3. WORRYING TOO MUCH ABOUT DIFFERENT THINGS: NEARLY EVERY DAY
GAD7 TOTAL SCORE: 20
2. NOT BEING ABLE TO STOP OR CONTROL WORRYING: NEARLY EVERY DAY
6. BECOMING EASILY ANNOYED OR IRRITABLE: NEARLY EVERY DAY
4. TROUBLE RELAXING: NEARLY EVERY DAY
7. FEELING AFRAID AS IF SOMETHING AWFUL MIGHT HAPPEN: NEARLY EVERY DAY
5. BEING SO RESTLESS THAT IT IS HARD TO SIT STILL: MORE THAN HALF THE DAYS

## 2022-01-25 ASSESSMENT — PATIENT HEALTH QUESTIONNAIRE - PHQ9
SUM OF ALL RESPONSES TO PHQ QUESTIONS 1-9: 16
CLINICAL INTERPRETATION OF PHQ2 SCORE: 6
5. POOR APPETITE OR OVEREATING: 3 - NEARLY EVERY DAY

## 2022-01-25 ASSESSMENT — PAIN SCALES - GENERAL: PAINLEVEL: NO PAIN

## 2022-01-25 NOTE — ASSESSMENT & PLAN NOTE
Patient admits to increased anxiety and depression over the past month as she is currently going through divorce but is requiring legal intervention.  She states that she has been having episodes of heightened anxiety to the point of panic attacks.  She is currently taking propranolol but feels like she needs something else to help.  She will be starting to see a counselor and her first session is today.  In regards to previous anxiety history she has been on Effexor when she was in her 20s and states that it made her feel numb.

## 2022-01-25 NOTE — PROGRESS NOTES
Virtual Visit: Established Patient   This visit was conducted via Zoom using secure and encrypted videoconferencing technology. The patient was in a private location in the state [facility].    The patient's identity was confirmed and verbal consent was obtained for this virtual visit.    Subjective:   CC:   Chief Complaint   Patient presents with   • Anxiety   • Panic Attack       Bhrati Shook is a 41 y.o. female presenting for evaluation and management of:    Anxiety and depression  Patient admits to increased anxiety and depression over the past month as she is currently going through divorce but is requiring legal intervention.  She states that she has been having episodes of heightened anxiety to the point of panic attacks.  She is currently taking propranolol but feels like she needs something else to help.  She will be starting to see a counselor and her first session is today.  In regards to previous anxiety history she has been on Effexor when she was in her 20s and states that it made her feel numb.    Depression Screening    Little interest or pleasure in doing things?  3 - nearly every day   Feeling down, depressed , or hopeless? 3 - nearly every day   Trouble falling or staying asleep, or sleeping too much?  3 - nearly every day   Feeling tired or having little energy?  1 - several days   Poor appetite or overeating?  3 - nearly every day   Feeling bad about yourself - or that you are a failure or have let yourself or your family down? 0 - not at all   Trouble concentrating on things, such as reading the newspaper or watching television? 3 - nearly every day   Moving or speaking so slowly that other people could have noticed.  Or the opposite - being so fidgety or restless that you have been moving around a lot more than usual?  0 - not at all   Thoughts that you would be better off dead, or of hurting yourself?  0 - not at all   Patient Health Questionnaire Score: 16     KJ-7 Questionnaire    Feeling  "nervous, anxious, or on edge: Nearly every day  Not being able to sop or control worrying: Nearly every day  Worrying too much about different things: Nearly every day  Trouble relaxing: Nearly every day  Being so restless that it's hard to sit still: More than half the days  Becoming easily annoyed or irritable: Nearly every day  Feeling afraid as if something awful might happen: Nearly every day  Total: 20    ROS   Denies any recent fevers or chills. No nausea or vomiting. No chest pains or shortness of breath.     Allergies   Allergen Reactions   • Seasonal        Current medicines (including changes today)  Current Outpatient Medications   Medication Sig Dispense Refill   • propranolol CR (INDERAL LA) 80 MG CAPSULE SR 24 HR TAKE 1 CAPSULE BY MOUTH EVERY DAY 90 Capsule 0   • cyanocobalamin (VITAMIN B-12) 100 MCG Tab Take 100 mcg by mouth every day.     • Cholecalciferol (VITAMIN D PO) Take  by mouth.       No current facility-administered medications for this visit.       Patient Active Problem List    Diagnosis Date Noted   • Vitamin D deficiency 04/02/2021   • IGT (impaired glucose tolerance) 04/02/2021   • Anxiety and depression 03/12/2021   • Malar rash 03/12/2021   • Essential hypertension 11/13/2018   • Seasonal allergies 11/13/2018       Family History   Problem Relation Age of Onset   • Thyroid Mother         hypothyroid, Hashimoto's   • Lupus Mother    • Thyroid Maternal Grandmother         hypothyroid, Hashimoto's   • No Known Problems Father    • No Known Problems Brother    • Cancer Maternal Grandfather         bone or blood cancer   • Lung Cancer Paternal Grandmother         smoking history   • No Known Problems Paternal Grandfather        She  has a past medical history of Allergy and Hypertension.  She  has a past surgical history that includes tympanoplasty (Bilateral).         Objective:   Ht 1.778 m (5' 10\")   LMP 01/17/2022 (Exact Date)   BMI 17.22 kg/m²   RR 12    Physical " Exam:  Constitutional: Alert, no distress, well-groomed.  Skin: No rashes in visible areas.  Eye: Round. Conjunctiva clear, lids normal. No icterus.   ENMT: Lips pink without lesions, good dentition, moist mucous membranes. Phonation normal.  Neck: No masses, no thyromegaly. Moves freely without pain.  Respiratory: Unlabored respiratory effort, no cough or audible wheeze  Psych: Alert and oriented x3, normal affect and mood.     Assessment and Plan:   The following treatment plan was discussed:     1. Anxiety and depression  Acute on chronic due to divorce.  We will start Lexapro 5 mg daily, side effects such as headaches, nausea, diarrhea, fatigue discussed with patient.  We will add hydroxyzine as needed for panic attacks, side effects such as drowsiness discussed with patient.  Continue seeing counselor to discuss relaxation techniques and further cope with this current change in her life..  - escitalopram (LEXAPRO) 5 MG tablet; Take 1 Tablet by mouth every day.  Dispense: 30 Tablet; Refill: 0  - hydrOXYzine HCl (ATARAX) 25 MG Tab; Take 1 Tablet by mouth 3 times a day as needed for Anxiety.  Dispense: 30 Tablet; Refill: 0    2. Panic attacks  - hydrOXYzine HCl (ATARAX) 25 MG Tab; Take 1 Tablet by mouth 3 times a day as needed for Anxiety.  Dispense: 30 Tablet; Refill: 0      Follow-up: Return in about 3 weeks (around 2/15/2022).         Karine Maynard PA-C ([place])  Physician Assistant Certified  [facility]    Please note that this dictation was created using voice recognition software. I have made every reasonable attempt to correct obvious errors, but I expect that there are errors of grammar and possibly content that I did not discover before finalizing the note.

## 2022-02-02 DIAGNOSIS — F41.9 ANXIETY: ICD-10-CM

## 2022-02-02 DIAGNOSIS — I10 ESSENTIAL HYPERTENSION: ICD-10-CM

## 2022-02-02 RX ORDER — PROPRANOLOL HYDROCHLORIDE 80 MG/1
80 CAPSULE, EXTENDED RELEASE ORAL DAILY
Qty: 90 CAPSULE | Refills: 0 | Status: SHIPPED | OUTPATIENT
Start: 2022-02-02 | End: 2022-05-09

## 2022-02-16 DIAGNOSIS — F41.9 ANXIETY AND DEPRESSION: ICD-10-CM

## 2022-02-16 DIAGNOSIS — F32.A ANXIETY AND DEPRESSION: ICD-10-CM

## 2022-02-22 RX ORDER — ESCITALOPRAM OXALATE 5 MG/1
5 TABLET ORAL DAILY
Qty: 30 TABLET | Refills: 0 | Status: SHIPPED | OUTPATIENT
Start: 2022-02-22 | End: 2022-02-25

## 2022-02-25 ENCOUNTER — TELEMEDICINE (OUTPATIENT)
Dept: MEDICAL GROUP | Facility: IMAGING CENTER | Age: 42
End: 2022-02-25
Payer: COMMERCIAL

## 2022-02-25 VITALS — HEIGHT: 70 IN | BODY MASS INDEX: 17.22 KG/M2

## 2022-02-25 DIAGNOSIS — Z79.899 MEDICATION MANAGEMENT: ICD-10-CM

## 2022-02-25 DIAGNOSIS — F41.9 ANXIETY AND DEPRESSION: ICD-10-CM

## 2022-02-25 DIAGNOSIS — F32.A ANXIETY AND DEPRESSION: ICD-10-CM

## 2022-02-25 PROCEDURE — 99213 OFFICE O/P EST LOW 20 MIN: CPT | Mod: 95 | Performed by: PHYSICIAN ASSISTANT

## 2022-02-25 RX ORDER — ESCITALOPRAM OXALATE 5 MG/1
5 TABLET ORAL DAILY
Qty: 90 TABLET | Refills: 1
Start: 2022-02-25 | End: 2022-02-27 | Stop reason: SDUPTHER

## 2022-02-25 ASSESSMENT — ANXIETY QUESTIONNAIRES
GAD7 TOTAL SCORE: 5
7. FEELING AFRAID AS IF SOMETHING AWFUL MIGHT HAPPEN: SEVERAL DAYS
6. BECOMING EASILY ANNOYED OR IRRITABLE: NOT AT ALL
3. WORRYING TOO MUCH ABOUT DIFFERENT THINGS: SEVERAL DAYS
4. TROUBLE RELAXING: SEVERAL DAYS
1. FEELING NERVOUS, ANXIOUS, OR ON EDGE: SEVERAL DAYS
2. NOT BEING ABLE TO STOP OR CONTROL WORRYING: SEVERAL DAYS
IF YOU CHECKED OFF ANY PROBLEMS ON THIS QUESTIONNAIRE, HOW DIFFICULT HAVE THESE PROBLEMS MADE IT FOR YOU TO DO YOUR WORK, TAKE CARE OF THINGS AT HOME, OR GET ALONG WITH OTHER PEOPLE: SOMEWHAT DIFFICULT
5. BEING SO RESTLESS THAT IT IS HARD TO SIT STILL: NOT AT ALL

## 2022-02-25 ASSESSMENT — PATIENT HEALTH QUESTIONNAIRE - PHQ9
SUM OF ALL RESPONSES TO PHQ QUESTIONS 1-9: 2
CLINICAL INTERPRETATION OF PHQ2 SCORE: 2
5. POOR APPETITE OR OVEREATING: 0 - NOT AT ALL

## 2022-02-25 ASSESSMENT — PAIN SCALES - GENERAL: PAINLEVEL: NO PAIN

## 2022-02-25 NOTE — PROGRESS NOTES
Virtual Visit: Established Patient   This visit was conducted via Zoom using secure and encrypted videoconferencing technology. The patient was in a private location in the [facility].    The patient's identity was confirmed and verbal consent was obtained for this virtual visit.    Subjective:   CC:   Chief Complaint   Patient presents with   • Anxiety   • Depression       Bharti Shook is a 41 y.o. female presenting for evaluation and management of:    Anxiety and depression  Patient overall feels significantly better on Lexapro.  She states that she has only had 1 panic attack since her last visit.  She states that she is able to cope with her current situation with her divorce better.  She states before she was extremely overwhelmed and now she has a clear mind and can make decisions without severe worry.  She is sleeping much better.  She uses hydroxyzine as needed maybe 1-2 nights a week.  No side effects from Lexapro or hydroxyzine.  She is also doing talk therapy.  She was previously doing weekly and is now doing it monthly.    Depression Screening    Little interest or pleasure in doing things?  1 - several days   Feeling down, depressed , or hopeless? 1 - several days   Trouble falling or staying asleep, or sleeping too much?  0 - not at all   Feeling tired or having little energy?  0 - not at all   Poor appetite or overeating?  0 - not at all   Feeling bad about yourself - or that you are a failure or have let yourself or your family down? 0 - not at all   Trouble concentrating on things, such as reading the newspaper or watching television? 0 - not at all   Moving or speaking so slowly that other people could have noticed.  Or the opposite - being so fidgety or restless that you have been moving around a lot more than usual?  0 - not at all   Thoughts that you would be better off dead, or of hurting yourself?  0 - not at all   Patient Health Questionnaire Score: 2     KJ-7 Questionnaire    Feeling  nervous, anxious, or on edge: Several days  Not being able to sop or control worrying: Several days  Worrying too much about different things: Several days  Trouble relaxing: Several days  Being so restless that it's hard to sit still: Not at all  Becoming easily annoyed or irritable: Not at all  Feeling afraid as if something awful might happen: Several days  Total: 5      ROS   Denies any recent fevers or chills. No nausea or vomiting. No chest pains or shortness of breath.     Allergies   Allergen Reactions   • Seasonal        Current medicines (including changes today)  Current Outpatient Medications   Medication Sig Dispense Refill   • escitalopram (LEXAPRO) 5 MG tablet Take 1 Tablet by mouth every day. 90 Tablet 1   • propranolol CR (INDERAL LA) 80 MG CAPSULE SR 24 HR TAKE 1 CAPSULE BY MOUTH EVERY DAY 90 Capsule 0   • hydrOXYzine HCl (ATARAX) 25 MG Tab Take 1 Tablet by mouth 3 times a day as needed for Anxiety. 30 Tablet 0   • cyanocobalamin (VITAMIN B-12) 100 MCG Tab Take 100 mcg by mouth every day.     • Cholecalciferol (VITAMIN D PO) Take  by mouth.       No current facility-administered medications for this visit.       Patient Active Problem List    Diagnosis Date Noted   • Vitamin D deficiency 04/02/2021   • IGT (impaired glucose tolerance) 04/02/2021   • Anxiety and depression 03/12/2021   • Malar rash 03/12/2021   • Essential hypertension 11/13/2018   • Seasonal allergies 11/13/2018       Family History   Problem Relation Age of Onset   • Thyroid Mother         hypothyroid, Hashimoto's   • Lupus Mother    • Thyroid Maternal Grandmother         hypothyroid, Hashimoto's   • No Known Problems Father    • No Known Problems Brother    • Cancer Maternal Grandfather         bone or blood cancer   • Lung Cancer Paternal Grandmother         smoking history   • No Known Problems Paternal Grandfather        She  has a past medical history of Allergy and Hypertension.  She  has a past surgical history that  "includes tympanoplasty (Bilateral).         Objective:   Ht 1.778 m (5' 10\")   LMP 02/12/2022 (Exact Date)   BMI 17.22 kg/m²   RR 12    Physical Exam:  Constitutional: Alert, no distress, well-groomed.  Skin: No rashes in visible areas.  Eye: Round. Conjunctiva clear, lids normal. No icterus.   ENMT: Lips pink without lesions, good dentition, moist mucous membranes. Phonation normal.  Neck: No masses, no thyromegaly. Moves freely without pain.  Respiratory: Unlabored respiratory effort, no cough or audible wheeze  Psych: Alert and oriented x3, normal affect and mood.     Assessment and Plan:   The following treatment plan was discussed:     1. Anxiety and depression  Chronic, improved on Lexapro.  Continue current regimen of Lexapro, propranolol, hydroxyzine as needed.  Continue talk therapy.  - escitalopram (LEXAPRO) 5 MG tablet; Take 1 Tablet by mouth every day.  Dispense: 90 Tablet; Refill: 1    2. Medication management  Continue Lexapro 5 mg daily and hydroxyzine 25 mg 3 times a day as needed.      Follow-up: Return in about 2 months (around 4/25/2022) for Pap smear, Annual physical, labs.         Karnie Maynard PA-C ([place])  Physician Assistant Certified  [facility]    Please note that this dictation was created using voice recognition software. I have made every reasonable attempt to correct obvious errors, but I expect that there are errors of grammar and possibly content that I did not discover before finalizing the note.  "

## 2022-02-25 NOTE — ASSESSMENT & PLAN NOTE
Patient overall feels significantly better on Lexapro.  She states that she has only had 1 panic attack since her last visit.  She states that she is able to cope with her current situation with her divorce better.  She states before she was extremely overwhelmed and now she has a clear mind and can make decisions without severe worry.  She is sleeping much better.  She uses hydroxyzine as needed maybe 1-2 nights a week.  No side effects from Lexapro or hydroxyzine.  She is also doing talk therapy.  She was previously doing weekly and is now doing it monthly.

## 2022-02-27 DIAGNOSIS — F41.9 ANXIETY AND DEPRESSION: ICD-10-CM

## 2022-02-27 DIAGNOSIS — F32.A ANXIETY AND DEPRESSION: ICD-10-CM

## 2022-02-28 RX ORDER — ESCITALOPRAM OXALATE 5 MG/1
5 TABLET ORAL DAILY
Qty: 90 TABLET | Refills: 1 | Status: SHIPPED | OUTPATIENT
Start: 2022-02-28

## 2022-05-08 DIAGNOSIS — I10 ESSENTIAL HYPERTENSION: ICD-10-CM

## 2022-05-08 DIAGNOSIS — F41.9 ANXIETY: ICD-10-CM

## 2022-05-09 RX ORDER — PROPRANOLOL HYDROCHLORIDE 80 MG/1
80 CAPSULE, EXTENDED RELEASE ORAL DAILY
Qty: 90 CAPSULE | Refills: 0 | Status: SHIPPED | OUTPATIENT
Start: 2022-05-09 | End: 2022-08-11

## 2022-05-09 NOTE — TELEPHONE ENCOUNTER
[id number]  Last OV     Received request via: Pharmacy    Was the patient seen in the last year in this department? Yes    Does the patient have an active prescription (recently filled or refills available) for medication(s) requested? No